# Patient Record
Sex: MALE | HISPANIC OR LATINO | Employment: FULL TIME | URBAN - METROPOLITAN AREA
[De-identification: names, ages, dates, MRNs, and addresses within clinical notes are randomized per-mention and may not be internally consistent; named-entity substitution may affect disease eponyms.]

---

## 2019-12-23 ENCOUNTER — HOSPITAL ENCOUNTER (EMERGENCY)
Facility: MEDICAL CENTER | Age: 29
End: 2019-12-23
Attending: EMERGENCY MEDICINE
Payer: COMMERCIAL

## 2019-12-23 VITALS
WEIGHT: 174.82 LBS | SYSTOLIC BLOOD PRESSURE: 117 MMHG | RESPIRATION RATE: 19 BRPM | BODY MASS INDEX: 28.1 KG/M2 | HEIGHT: 66 IN | DIASTOLIC BLOOD PRESSURE: 75 MMHG | HEART RATE: 71 BPM | OXYGEN SATURATION: 98 % | TEMPERATURE: 97.9 F

## 2019-12-23 DIAGNOSIS — R10.9 FLANK PAIN: ICD-10-CM

## 2019-12-23 LAB
ANION GAP SERPL CALC-SCNC: 9 MMOL/L (ref 0–11.9)
APPEARANCE UR: CLEAR
BACTERIA #/AREA URNS HPF: NEGATIVE /HPF
BILIRUB UR QL STRIP.AUTO: NEGATIVE
BUN SERPL-MCNC: 15 MG/DL (ref 8–22)
CALCIUM SERPL-MCNC: 9.1 MG/DL (ref 8.5–10.5)
CHLORIDE SERPL-SCNC: 105 MMOL/L (ref 96–112)
CO2 SERPL-SCNC: 25 MMOL/L (ref 20–33)
COLOR UR: YELLOW
CREAT SERPL-MCNC: 0.87 MG/DL (ref 0.5–1.4)
EPI CELLS #/AREA URNS HPF: NEGATIVE /HPF
GLUCOSE SERPL-MCNC: 102 MG/DL (ref 65–99)
GLUCOSE UR STRIP.AUTO-MCNC: NEGATIVE MG/DL
HYALINE CASTS #/AREA URNS LPF: ABNORMAL /LPF
KETONES UR STRIP.AUTO-MCNC: NEGATIVE MG/DL
LEUKOCYTE ESTERASE UR QL STRIP.AUTO: NEGATIVE
MICRO URNS: ABNORMAL
NITRITE UR QL STRIP.AUTO: NEGATIVE
PH UR STRIP.AUTO: 7 [PH] (ref 5–8)
POTASSIUM SERPL-SCNC: 4 MMOL/L (ref 3.6–5.5)
PROT UR QL STRIP: NEGATIVE MG/DL
RBC # URNS HPF: ABNORMAL /HPF
RBC UR QL AUTO: ABNORMAL
SODIUM SERPL-SCNC: 139 MMOL/L (ref 135–145)
SP GR UR STRIP.AUTO: 1.01
UROBILINOGEN UR STRIP.AUTO-MCNC: 0.2 MG/DL
WBC #/AREA URNS HPF: ABNORMAL /HPF

## 2019-12-23 PROCEDURE — 36415 COLL VENOUS BLD VENIPUNCTURE: CPT

## 2019-12-23 PROCEDURE — 700111 HCHG RX REV CODE 636 W/ 250 OVERRIDE (IP): Performed by: EMERGENCY MEDICINE

## 2019-12-23 PROCEDURE — 96372 THER/PROPH/DIAG INJ SC/IM: CPT

## 2019-12-23 PROCEDURE — 80048 BASIC METABOLIC PNL TOTAL CA: CPT

## 2019-12-23 PROCEDURE — 99284 EMERGENCY DEPT VISIT MOD MDM: CPT

## 2019-12-23 PROCEDURE — 81001 URINALYSIS AUTO W/SCOPE: CPT

## 2019-12-23 RX ORDER — KETOROLAC TROMETHAMINE 10 MG/1
10 TABLET, FILM COATED ORAL EVERY 6 HOURS PRN
Qty: 20 TAB | Refills: 0 | Status: SHIPPED | OUTPATIENT
Start: 2019-12-23 | End: 2019-12-28

## 2019-12-23 RX ORDER — ONDANSETRON 4 MG/1
4 TABLET, ORALLY DISINTEGRATING ORAL ONCE
Status: COMPLETED | OUTPATIENT
Start: 2019-12-23 | End: 2019-12-23

## 2019-12-23 RX ORDER — TAMSULOSIN HYDROCHLORIDE 0.4 MG/1
0.4 CAPSULE ORAL DAILY
Qty: 30 CAP | Refills: 0 | Status: SHIPPED | OUTPATIENT
Start: 2019-12-23

## 2019-12-23 RX ORDER — KETOROLAC TROMETHAMINE 30 MG/ML
15 INJECTION, SOLUTION INTRAMUSCULAR; INTRAVENOUS ONCE
Status: COMPLETED | OUTPATIENT
Start: 2019-12-23 | End: 2019-12-23

## 2019-12-23 RX ADMIN — KETOROLAC TROMETHAMINE 15 MG: 30 INJECTION, SOLUTION INTRAMUSCULAR at 22:32

## 2019-12-23 RX ADMIN — ONDANSETRON 4 MG: 4 TABLET, ORALLY DISINTEGRATING ORAL at 22:32

## 2019-12-23 SDOH — HEALTH STABILITY: MENTAL HEALTH: HOW OFTEN DO YOU HAVE 6 OR MORE DRINKS ON ONE OCCASION?: NEVER

## 2019-12-23 SDOH — HEALTH STABILITY: MENTAL HEALTH: HOW MANY STANDARD DRINKS CONTAINING ALCOHOL DO YOU HAVE ON A TYPICAL DAY?: 1 OR 2

## 2019-12-23 SDOH — HEALTH STABILITY: MENTAL HEALTH: HOW OFTEN DO YOU HAVE A DRINK CONTAINING ALCOHOL?: 2-3 TIMES A WEEK

## 2019-12-23 ASSESSMENT — PAIN DESCRIPTION - DESCRIPTORS: DESCRIPTORS: PRESSURE;CRAMPING

## 2019-12-23 ASSESSMENT — ENCOUNTER SYMPTOMS
BLOOD IN STOOL: 0
BACK PAIN: 1
FEVER: 0

## 2019-12-23 ASSESSMENT — LIFESTYLE VARIABLES: DO YOU DRINK ALCOHOL: NO

## 2019-12-24 NOTE — ED NOTES
Education provided to on discharge instructions, follow up instructions, and medication instructions. Pt verbalized understanding. Pt dressed and walked out in stable condition.     Pt sent with two scripts.

## 2019-12-24 NOTE — DISCHARGE INSTRUCTIONS
You likely have a kidney stone that caused your symptoms, since we deferred CT we cannot say for sure but given your history it is the most likely diagnosis.

## 2019-12-24 NOTE — ED PROVIDER NOTES
ED Provider Note    Scribed for Tristen Fallon M.D. by Catherine Barnhart. 12/23/2019  10:16 PM    Means of arrival: Walk in  History obtained by: patient  Limitations: none      CHIEF COMPLAINT  Chief Complaint   Patient presents with   • Flank Pain     c/o right flank pain x 30 mins. + N/V. Hx of kidney stone. described pain as pressure / cramping.       HPI  Octavio Ambrocio is a 29 y.o. male who presents to the emergency department for possible kidney stones. He has associated right sided back pain and dysuria.  Patient reports pain is colicky, it comes in waves that are periodic, sporadic and resolves without any intervention.  He reports the pain feels identical to prior kidney stones.  At this time his pain is improved and he continues to have a burning sensation. Endorses sweating and emesis x2 secondary to pain.  He denies any associated chest pain or shortness of breath.  Patient denies any right upper quadrant pain or abdominal pain otherwise.  Denies past surgical history for removal of kidney stones. Denies fevers, hematuria, hematochezia or melena.  Emesis was nonbloody nonbilious.    REVIEW OF SYSTEMS  Review of Systems   Constitutional: Negative for fever.   Gastrointestinal: Negative for blood in stool and melena.   Genitourinary: Positive for dysuria. Negative for hematuria.   Musculoskeletal: Positive for back pain.       See HPI for further details.     PAST MEDICAL HISTORY   has a past medical history of Kidney stone.    SOCIAL HISTORY  Social History     Tobacco Use   • Smoking status: Never Smoker   • Smokeless tobacco: Never Used   Substance and Sexual Activity   • Alcohol use: Yes     Frequency: 2-3 times a week     Drinks per session: 1 or 2     Binge frequency: Never   • Drug use: No   • Sexual activity: Not on file       SURGICAL HISTORY  patient denies any surgical history    CURRENT MEDICATIONS  Home Medications     Reviewed by Alexandr Chahal R.N. (Registered Nurse) on 12/23/19 at 1944  Med  "List Status: Complete   Medication Last Dose Status        Patient Scott Taking any Medications                       ALLERGIES  No Known Allergies    PHYSICAL EXAM  /102   Pulse 72   Temp 36.6 °C (97.9 °F) (Temporal)   Resp 19   Ht 1.676 m (5' 6\")   Wt 79.3 kg (174 lb 13.2 oz)   SpO2 94%   BMI 28.22 kg/m²     Constitutional: Well developed, Well nourished, No acute distress, Non-toxic appearance.   HENT: Normocephalic, Atraumatic,  Eyes: PERRL, EOM intact  Neck: Supple, no meningismus  Lymphatic: No lymphadenopathy noted.   Cardiovascular: Regular rate and rhythm  Lungs: Clear to auscultation bilaterally, easy unlabored respirations   Abdomen: Bowel sounds normal, Soft, No tenderness  Skin: Warm, Dry, no rash  Back: Mild right sided CVA tenderness.  Extremities: No edema to lower extremities  Neurologic: Alert and oriented, appropriate, follows commands, moving all extremities, normal speech   Psychiatric: Affect normal      DIAGNOSTIC STUDIES / PROCEDURES    LABS  Results for orders placed or performed during the hospital encounter of 12/23/19   URINALYSIS,CULTURE IF INDICATED   Result Value Ref Range    Color Yellow     Character Clear     Specific Gravity 1.012 <1.035    Ph 7.0 5.0 - 8.0    Glucose Negative Negative mg/dL    Ketones Negative Negative mg/dL    Protein Negative Negative mg/dL    Bilirubin Negative Negative    Urobilinogen, Urine 0.2 Negative    Nitrite Negative Negative    Leukocyte Esterase Negative Negative    Occult Blood Trace (A) Negative    Micro Urine Req Microscopic    Basic Metabolic Panel   Result Value Ref Range    Sodium 139 135 - 145 mmol/L    Potassium 4.0 3.6 - 5.5 mmol/L    Chloride 105 96 - 112 mmol/L    Co2 25 20 - 33 mmol/L    Glucose 102 (H) 65 - 99 mg/dL    Bun 15 8 - 22 mg/dL    Creatinine 0.87 0.50 - 1.40 mg/dL    Calcium 9.1 8.5 - 10.5 mg/dL    Anion Gap 9.0 0.0 - 11.9   URINE MICROSCOPIC (W/UA)   Result Value Ref Range    WBC 0-2 (A) /hpf    RBC 2-5 (A) /hpf "    Bacteria Negative None /hpf    Epithelial Cells Negative /hpf    Hyaline Cast 0-2 /lpf   ESTIMATED GFR   Result Value Ref Range    GFR If African American >60 >60 mL/min/1.73 m 2    GFR If Non African American >60 >60 mL/min/1.73 m 2     All labs reviewed by me.    COURSE & MEDICAL DECISION MAKING  Pertinent Labs & Imaging studies reviewed. (See chart for details)    Patient here symptoms consistent with kidney stone clinically.  Abdominal pathology otherwise unlikely given patient's benign abdominal exam though possible.  Patient would like to defer CT, he is worried with the cost and radiation.  I discussed with him that without CT we cannot fully rule out alternative pathology or identify the size of the stone but given patient's very reassuring abdominal exam and his bedside ultrasound which fails to reveal any severe hydronephrosis I am comfortable deferring at this point in lieu of strict return precautions.  Patient's urinalysis without any evidence of infection.  Patient's kidney function is normal.  Home with follow-up with urology, return precautions and Toradol and Flomax.      10:16 PM Patient seen and examined at bedside. The patient presents with right sided back pain and dysuria.Discussed CT to confirm stone and evaluate side but patient deferred due to radiation exposure. I discussed that we will check labs to evaluate his kidney function. Ordered for urine microscopic, BMP, and UA to evaluate. Patient will be treated with Toradol 15 mg and Zofran 4 mg for his symptoms.     11:34 PM Patient was reevaluated at bedside. Discussed lab results with the patient and informed them that everything looked normal. Prescribed Toradol and Flomax for treatment of the patients symptoms. Advised patient to return if his symptoms do not improve in the next 5 days. Patient will be discharged at this time. Patient verbalizes agreement with discharge and plan of care.        The patient will return for new or  worsening symptoms and is stable at the time of discharge.    The patient is referred to a primary physician for blood pressure management, diabetic screening, and for all other preventative health concerns.    DISPOSITION:  Patient will be discharged home in stable condition.    FOLLOW UP:  [facility], Emergency Dept  [address]  [place] [id number] [phone]  In 5 days  if symptoms haven't resolved    Gino Upton M.D.  [address]  [place] [address]  [phone]    Schedule an appointment as soon as possible for a visit         OUTPATIENT MEDICATIONS:  New Prescriptions    KETOROLAC (TORADOL) 10 MG TAB    Take 1 Tab by mouth every 6 hours as needed for Moderate Pain or Severe Pain for up to 5 days.    TAMSULOSIN (FLOMAX) 0.4 MG CAPSULE    Take 1 Cap by mouth every day.       FINAL IMPRESSION  1. Flank pain          Catherine ALICEA (Scribe), am scribing for, and in the presence of, Tristen Fallon M.D..    Electronically signed by: Catherine Barnhart (Scribe), 12/23/2019    ITristen M.D. personally performed the services described in this documentation, as scribed by Catherine Barnhart in my presence, and it is both accurate and complete. E    The note accurately reflects work and decisions made by me.  Tristen Fallon  12/24/2019  2:27 AM

## 2019-12-24 NOTE — ED NOTES
"Agree with triage assessment, no changes noted. Pt reports \"This feels like my old kidney stone.\" Pt reports pain of 8 out of 10. Pt hooked to monitor. Pt denies any further needs at this time. Call light within reach. Bed in low locked position. Comfort measures provided.     Labs sent. Medicated per MAR.   "

## 2019-12-24 NOTE — ED TRIAGE NOTES
Octavio Ambrocio  29 y.o.  male  Chief Complaint   Patient presents with   • Flank Pain     c/o right flank pain x 30 mins. + N/V. Hx of kidney stone. described pain as pressure / cramping.

## 2022-12-11 ENCOUNTER — APPOINTMENT (OUTPATIENT)
Dept: RADIOLOGY | Facility: MEDICAL CENTER | Age: 32
End: 2022-12-11
Attending: EMERGENCY MEDICINE

## 2022-12-11 ENCOUNTER — HOSPITAL ENCOUNTER (EMERGENCY)
Facility: MEDICAL CENTER | Age: 32
End: 2022-12-11
Attending: EMERGENCY MEDICINE

## 2022-12-11 VITALS
HEART RATE: 74 BPM | SYSTOLIC BLOOD PRESSURE: 121 MMHG | TEMPERATURE: 97.5 F | OXYGEN SATURATION: 95 % | BODY MASS INDEX: 28.24 KG/M2 | DIASTOLIC BLOOD PRESSURE: 77 MMHG | HEIGHT: 66 IN | RESPIRATION RATE: 16 BRPM | WEIGHT: 175.71 LBS

## 2022-12-11 DIAGNOSIS — N20.1 URETEROLITHIASIS: ICD-10-CM

## 2022-12-11 LAB
ALBUMIN SERPL BCP-MCNC: 4.5 G/DL (ref 3.2–4.9)
ALBUMIN/GLOB SERPL: 1.5 G/DL
ALP SERPL-CCNC: 92 U/L (ref 30–99)
ALT SERPL-CCNC: 68 U/L (ref 2–50)
ANION GAP SERPL CALC-SCNC: 13 MMOL/L (ref 7–16)
APPEARANCE UR: CLEAR
AST SERPL-CCNC: 36 U/L (ref 12–45)
BACTERIA #/AREA URNS HPF: NEGATIVE /HPF
BASOPHILS # BLD AUTO: 0.6 % (ref 0–1.8)
BASOPHILS # BLD: 0.07 K/UL (ref 0–0.12)
BILIRUB SERPL-MCNC: 0.3 MG/DL (ref 0.1–1.5)
BILIRUB UR QL STRIP.AUTO: NEGATIVE
BUN SERPL-MCNC: 15 MG/DL (ref 8–22)
CALCIUM SERPL-MCNC: 8.9 MG/DL (ref 8.5–10.5)
CHLORIDE SERPL-SCNC: 103 MMOL/L (ref 96–112)
CO2 SERPL-SCNC: 22 MMOL/L (ref 20–33)
COLOR UR: YELLOW
CREAT SERPL-MCNC: 1.02 MG/DL (ref 0.5–1.4)
EOSINOPHIL # BLD AUTO: 0.39 K/UL (ref 0–0.51)
EOSINOPHIL NFR BLD: 3.5 % (ref 0–6.9)
EPI CELLS #/AREA URNS HPF: NEGATIVE /HPF
ERYTHROCYTE [DISTWIDTH] IN BLOOD BY AUTOMATED COUNT: 42.6 FL (ref 35.9–50)
GFR SERPLBLD CREATININE-BSD FMLA CKD-EPI: 99 ML/MIN/1.73 M 2
GLOBULIN SER CALC-MCNC: 3 G/DL (ref 1.9–3.5)
GLUCOSE SERPL-MCNC: 101 MG/DL (ref 65–99)
GLUCOSE UR STRIP.AUTO-MCNC: NEGATIVE MG/DL
HCT VFR BLD AUTO: 44.1 % (ref 42–52)
HGB BLD-MCNC: 15.5 G/DL (ref 14–18)
IMM GRANULOCYTES # BLD AUTO: 0.04 K/UL (ref 0–0.11)
IMM GRANULOCYTES NFR BLD AUTO: 0.4 % (ref 0–0.9)
KETONES UR STRIP.AUTO-MCNC: NEGATIVE MG/DL
LEUKOCYTE ESTERASE UR QL STRIP.AUTO: NEGATIVE
LIPASE SERPL-CCNC: 21 U/L (ref 11–82)
LYMPHOCYTES # BLD AUTO: 2.01 K/UL (ref 1–4.8)
LYMPHOCYTES NFR BLD: 18.1 % (ref 22–41)
MCH RBC QN AUTO: 30.8 PG (ref 27–33)
MCHC RBC AUTO-ENTMCNC: 35.1 G/DL (ref 33.7–35.3)
MCV RBC AUTO: 87.7 FL (ref 81.4–97.8)
MICRO URNS: ABNORMAL
MONOCYTES # BLD AUTO: 0.5 K/UL (ref 0–0.85)
MONOCYTES NFR BLD AUTO: 4.5 % (ref 0–13.4)
MUCOUS THREADS #/AREA URNS HPF: ABNORMAL /HPF
NEUTROPHILS # BLD AUTO: 8.1 K/UL (ref 1.82–7.42)
NEUTROPHILS NFR BLD: 72.9 % (ref 44–72)
NITRITE UR QL STRIP.AUTO: NEGATIVE
NRBC # BLD AUTO: 0 K/UL
NRBC BLD-RTO: 0 /100 WBC
PH UR STRIP.AUTO: 7.5 [PH] (ref 5–8)
PLATELET # BLD AUTO: 279 K/UL (ref 164–446)
PMV BLD AUTO: 10.1 FL (ref 9–12.9)
POTASSIUM SERPL-SCNC: 3.6 MMOL/L (ref 3.6–5.5)
PROT SERPL-MCNC: 7.5 G/DL (ref 6–8.2)
PROT UR QL STRIP: NEGATIVE MG/DL
RBC # BLD AUTO: 5.03 M/UL (ref 4.7–6.1)
RBC # URNS HPF: >150 /HPF
RBC UR QL AUTO: ABNORMAL
SODIUM SERPL-SCNC: 138 MMOL/L (ref 135–145)
SP GR UR STRIP.AUTO: 1.01
UROBILINOGEN UR STRIP.AUTO-MCNC: 0.2 MG/DL
WBC # BLD AUTO: 11.1 K/UL (ref 4.8–10.8)
WBC #/AREA URNS HPF: ABNORMAL /HPF

## 2022-12-11 PROCEDURE — 80053 COMPREHEN METABOLIC PANEL: CPT

## 2022-12-11 PROCEDURE — 700111 HCHG RX REV CODE 636 W/ 250 OVERRIDE (IP): Performed by: EMERGENCY MEDICINE

## 2022-12-11 PROCEDURE — 74176 CT ABD & PELVIS W/O CONTRAST: CPT

## 2022-12-11 PROCEDURE — 81001 URINALYSIS AUTO W/SCOPE: CPT

## 2022-12-11 PROCEDURE — 96374 THER/PROPH/DIAG INJ IV PUSH: CPT

## 2022-12-11 PROCEDURE — 99284 EMERGENCY DEPT VISIT MOD MDM: CPT

## 2022-12-11 PROCEDURE — 96375 TX/PRO/DX INJ NEW DRUG ADDON: CPT

## 2022-12-11 PROCEDURE — 83690 ASSAY OF LIPASE: CPT

## 2022-12-11 PROCEDURE — 36415 COLL VENOUS BLD VENIPUNCTURE: CPT

## 2022-12-11 PROCEDURE — 700102 HCHG RX REV CODE 250 W/ 637 OVERRIDE(OP): Performed by: EMERGENCY MEDICINE

## 2022-12-11 PROCEDURE — 85025 COMPLETE CBC W/AUTO DIFF WBC: CPT

## 2022-12-11 PROCEDURE — A9270 NON-COVERED ITEM OR SERVICE: HCPCS | Performed by: EMERGENCY MEDICINE

## 2022-12-11 RX ORDER — OXYCODONE HYDROCHLORIDE AND ACETAMINOPHEN 5; 325 MG/1; MG/1
1 TABLET ORAL EVERY 4 HOURS PRN
Qty: 20 TABLET | Refills: 0 | Status: SHIPPED | OUTPATIENT
Start: 2022-12-11 | End: 2022-12-18

## 2022-12-11 RX ORDER — ONDANSETRON 4 MG/1
4 TABLET, ORALLY DISINTEGRATING ORAL EVERY 6 HOURS PRN
Qty: 15 TABLET | Refills: 0 | Status: SHIPPED | OUTPATIENT
Start: 2022-12-11

## 2022-12-11 RX ORDER — HYDROMORPHONE HYDROCHLORIDE 1 MG/ML
0.5 INJECTION, SOLUTION INTRAMUSCULAR; INTRAVENOUS; SUBCUTANEOUS ONCE
Status: COMPLETED | OUTPATIENT
Start: 2022-12-11 | End: 2022-12-11

## 2022-12-11 RX ORDER — TAMSULOSIN HYDROCHLORIDE 0.4 MG/1
0.4 CAPSULE ORAL
Qty: 15 CAPSULE | Refills: 0 | Status: SHIPPED | OUTPATIENT
Start: 2022-12-11

## 2022-12-11 RX ORDER — KETOROLAC TROMETHAMINE 30 MG/ML
30 INJECTION, SOLUTION INTRAMUSCULAR; INTRAVENOUS ONCE
Status: COMPLETED | OUTPATIENT
Start: 2022-12-11 | End: 2022-12-11

## 2022-12-11 RX ORDER — TAMSULOSIN HYDROCHLORIDE 0.4 MG/1
0.4 CAPSULE ORAL ONCE
Status: COMPLETED | OUTPATIENT
Start: 2022-12-11 | End: 2022-12-11

## 2022-12-11 RX ORDER — ONDANSETRON 2 MG/ML
4 INJECTION INTRAMUSCULAR; INTRAVENOUS ONCE
Status: COMPLETED | OUTPATIENT
Start: 2022-12-11 | End: 2022-12-11

## 2022-12-11 RX ADMIN — KETOROLAC TROMETHAMINE 30 MG: 30 INJECTION, SOLUTION INTRAMUSCULAR; INTRAVENOUS at 20:02

## 2022-12-11 RX ADMIN — HYDROMORPHONE HYDROCHLORIDE 0.5 MG: 1 INJECTION, SOLUTION INTRAMUSCULAR; INTRAVENOUS; SUBCUTANEOUS at 20:02

## 2022-12-11 RX ADMIN — TAMSULOSIN HYDROCHLORIDE 0.4 MG: 0.4 CAPSULE ORAL at 20:19

## 2022-12-11 RX ADMIN — ONDANSETRON 4 MG: 2 INJECTION INTRAMUSCULAR; INTRAVENOUS at 20:01

## 2022-12-12 NOTE — ED NOTES
Patient educated on discharge instructions, follow up appointments, prescriptions, and home care. Patient verbalized understanding. Patient ambulated well to [facility].

## 2022-12-12 NOTE — DISCHARGE INSTRUCTIONS
Please follow-up with urology to discuss future stone prevention  Take Percocet as needed for pain control with ibuprofen and Zofran for nausea  Take Flomax until this kidney stone passes.

## 2022-12-12 NOTE — ED TRIAGE NOTES
Pt ambulated to triage with   Chief Complaint   Patient presents with    Flank Pain     H/o kidney stone, 2 months ago had right flank pain and today got worse.  Pt reports vomiting.  Reports blood in urine today.         Protocol ordered.  Pt Informed regarding triage process and verbalized understanding to inform triage tech or RN for any changes in condition. Placed in lobby.

## 2022-12-12 NOTE — ED PROVIDER NOTES
"ED Provider Note    CHIEF COMPLAINT  Chief Complaint   Patient presents with    Flank Pain     H/o kidney stone, 2 months ago had right flank pain and today got worse.  Pt reports vomiting.  Reports blood in urine today.         HPI  Octavio Ambrocio is a 32 y.o. male with a kidney stone history in the past x3 other episodes states that he began having pain at 5 PM acutely on the right flank area that is not extending into his right groin or testicle.  He vomited once and is in considerable pain at this time.  Did report blood in his urine today.  Denies any left-sided pain at all.  Did not take anything prior to arrival and reports his pain is severe and constant sharp in character and well localized to the right flank    REVIEW OF SYSTEMS  See HPI for further details. All other systems are negative.     PAST MEDICAL HISTORY  Past Medical History:   Diagnosis Date    Kidney stone        FAMILY HISTORY  History reviewed. No pertinent family history.    SOCIAL HISTORY   reports that he has never smoked. He has never used smokeless tobacco. He reports current alcohol use. He reports that he does not use drugs.    SURGICAL HISTORY  History reviewed. No pertinent surgical history.    CURRENT MEDICATIONS  Home Medications       Reviewed by Ana Tao R.N. (Registered Nurse) on 12/11/22 at [facility]  Med List Status: Partial     Medication Last Dose Status   tamsulosin (FLOMAX) 0.4 MG capsule  Active                    ALLERGIES  No Known Allergies    PHYSICAL EXAM  VITAL SIGNS: /79   Pulse 78   Temp 36.1 °C (96.9 °F) (Temporal)   Resp 16   Ht 1.676 m (5' 6\")   Wt 79.7 kg (175 lb 11.3 oz)   SpO2 97%   BMI 28.36 kg/m²    Constitutional: Well developed, Well nourished, No acute distress, uncomfortable appearance.   HENT: Normocephalic, Atraumatic, Bilateral external ears normal, Oropharynx is clear mucous membranes are moist. No oral exudates or nasal discharge.   Eyes: Pupils are equal round and reactive, " EOMI, Conjunctiva normal, No discharge.   Neck: Normal range of motion, No tenderness, Supple, No stridor. No meningismus.  Lymphatic: No lymphadenopathy noted.   Cardiovascular: Regular rate and rhythm without murmur rub or gallop.  Thorax & Lungs: Clear breath sounds bilaterally without wheezes, rhonchi or rales. There is no chest wall tenderness.   Abdomen: Soft non-tender non-distended. There is no rebound or guarding. No organomegaly is appreciated. Bowel sounds are normal.  Skin: Normal without rash.   Back: Right-sided CVA and right flank tenderness.   Extremities: Intact distal pulses, No edema, No tenderness, No cyanosis, No clubbing. Capillary refill is less than 2 seconds.  Musculoskeletal: Good range of motion in all major joints. No tenderness to palpation or major deformities noted.   Neurologic: Alert & oriented x 3, Normal motor function, Normal sensory function, No focal deficits noted. Reflexes are normal.  Psychiatric: Affect normal, Judgment normal, Mood normal. There is no suicidal ideation or patient reported hallucinations.       RADIOLOGY/PROCEDURES  CT-RENAL COLIC EVALUATION(A/P W/O)   Final Result      1.  Calcified old granulomatous disease with a tiny 4 mm calcified granuloma in the left lung lower lobe.   2.  3 mm right UVJ stone with surrounding inflammatory stranding. Mild calyceal dilatation on the right.   3.  2 mm nonobstructive calculus in the left kidney at the lower-midpole.   4.  Normal appendix            COURSE & MEDICAL DECISION MAKING  Pertinent Labs & Imaging studies reviewed. (See chart for details)  At high suspicion for recurrence of another kidney stone in this gentleman who has significant right flank pain and hematuria earlier grossly.  He was delayed in give us urine sample but did get medicated with Dilaudid and Toradol as well as Zofran and this gave him significant relief.  He did not require IV fluids.    CAT scan noncontrast of the abdomen pelvis/collecting  system shows old granulomatous disease with a 4 mm calcified granuloma of the left lower lobe of the lung.  Does have a 3 mm right UVJ stone with surrounding inflammatory stranding.  Also has a nonobstructive calculus in the left kidney is 2 mm    Laboratory evaluation shows slight leukocytosis 11,000 with 73% PMN shift but no significant electrolyte derangements or acidosis.  Urinalysis shows blood but no evidence of infection.    I reviewed his  and gave him a prescription for Percocet to be used as needed with ibuprofen and Zofran and Flomax until he passes this kidney stone we will strain all urine.  Discharged in stable condition will return if any significant change in symptoms    FINAL IMPRESSION  1. Ureterolithiasis             Electronically signed by: Francisco Rogel M.D., 12/11/2022 8:44 PM

## 2022-12-12 NOTE — ED NOTES
Pt ambulatory to rm w/o assistance. Changed, connected to VS, resting comfortably NAD w/ equal chest rise/fall. Chart up for ERP.

## 2022-12-12 NOTE — ED NOTES
"Pt discharged to [facility], ambulated with steady gait, AOx4. IV discontinued and gauze placed, pt in possession of belongings. Pt provided discharge education and information pertaining to medications and follow up appointments. Pt received copy of discharge instructions and verbalized understanding. /77   Pulse 74   Temp 36.4 °C (97.5 °F) (Temporal)   Resp 16   Ht 1.676 m (5' 6\")   Wt 79.7 kg (175 lb 11.3 oz)   SpO2 95%   BMI 28.36 kg/m²   "

## 2024-03-02 ENCOUNTER — APPOINTMENT (OUTPATIENT)
Dept: RADIOLOGY | Facility: MEDICAL CENTER | Age: 34
End: 2024-03-02
Attending: EMERGENCY MEDICINE
Payer: COMMERCIAL

## 2024-03-02 ENCOUNTER — HOSPITAL ENCOUNTER (EMERGENCY)
Facility: MEDICAL CENTER | Age: 34
End: 2024-03-02
Attending: EMERGENCY MEDICINE
Payer: COMMERCIAL

## 2024-03-02 VITALS
HEART RATE: 69 BPM | DIASTOLIC BLOOD PRESSURE: 83 MMHG | WEIGHT: 187.83 LBS | SYSTOLIC BLOOD PRESSURE: 132 MMHG | RESPIRATION RATE: 16 BRPM | HEIGHT: 66 IN | TEMPERATURE: 97.8 F | BODY MASS INDEX: 30.19 KG/M2 | OXYGEN SATURATION: 97 %

## 2024-03-02 DIAGNOSIS — S20.212A CONTUSION OF LEFT CHEST WALL, INITIAL ENCOUNTER: ICD-10-CM

## 2024-03-02 DIAGNOSIS — M54.6 ACUTE LEFT-SIDED THORACIC BACK PAIN: ICD-10-CM

## 2024-03-02 PROCEDURE — 72070 X-RAY EXAM THORAC SPINE 2VWS: CPT

## 2024-03-02 PROCEDURE — 71111 X-RAY EXAM RIBS/CHEST4/> VWS: CPT

## 2024-03-02 PROCEDURE — A9270 NON-COVERED ITEM OR SERVICE: HCPCS | Performed by: EMERGENCY MEDICINE

## 2024-03-02 PROCEDURE — 700102 HCHG RX REV CODE 250 W/ 637 OVERRIDE(OP): Performed by: EMERGENCY MEDICINE

## 2024-03-02 PROCEDURE — 99283 EMERGENCY DEPT VISIT LOW MDM: CPT

## 2024-03-02 RX ORDER — OXYCODONE HYDROCHLORIDE AND ACETAMINOPHEN 5; 325 MG/1; MG/1
1 TABLET ORAL ONCE
Status: COMPLETED | OUTPATIENT
Start: 2024-03-02 | End: 2024-03-02

## 2024-03-02 RX ORDER — NAPROXEN 500 MG/1
500 TABLET ORAL 2 TIMES DAILY WITH MEALS
Qty: 60 TABLET | Refills: 0 | Status: SHIPPED | OUTPATIENT
Start: 2024-03-02

## 2024-03-02 RX ORDER — OXYCODONE HYDROCHLORIDE AND ACETAMINOPHEN 5; 325 MG/1; MG/1
1 TABLET ORAL EVERY 4 HOURS PRN
Qty: 8 TABLET | Refills: 0 | Status: SHIPPED | OUTPATIENT
Start: 2024-03-02 | End: 2024-03-05

## 2024-03-02 RX ORDER — IBUPROFEN 600 MG/1
600 TABLET ORAL ONCE
Status: COMPLETED | OUTPATIENT
Start: 2024-03-02 | End: 2024-03-02

## 2024-03-02 RX ADMIN — IBUPROFEN 600 MG: 600 TABLET, FILM COATED ORAL at 15:36

## 2024-03-02 RX ADMIN — OXYCODONE AND ACETAMINOPHEN 1 TABLET: 5; 325 TABLET ORAL at 15:36

## 2024-03-02 ASSESSMENT — FIBROSIS 4 INDEX: FIB4 SCORE: 0.55

## 2024-03-02 NOTE — ED TRIAGE NOTES
Octaviopatrick Ambrocio  33 y.o. male  Chief Complaint   Patient presents with    Back Pain     Pt sneezed this morning and then felt tear/tweak in back.   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 Fell and hit back onto top of fire hydrant (4 days ago) Pain was ok at this time until today.   L Mid back        Pt amb to triage with steady gait for above complaint.   Pt is alert and oriented, speaking in full sentences, follows commands and responds appropriately to questions. Not in any apparent distress. Respirations are even and unlabored.  Pt placed in lobby. Pt educated on triage process. Pt encouraged to alert staff for any changes.

## 2024-03-02 NOTE — ED PROVIDER NOTES
ED Provider Note    CHIEF COMPLAINT  Chief Complaint   Patient presents with    Back Pain     Pt sneezed this morning and then felt tear/tweak in back.   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 Fell and hit back onto top of fire hydrant (4 days ago) Pain was ok at this time until today.   L Mid back      EXTERNAL RECORDS REVIEWED  Patient was last seen in the emergency department December 2022 for ureterolithiasis    HPI/ROS  LIMITATION TO HISTORY   Select: : None  OUTSIDE HISTORIAN(S):  None    Octavio Ambrocio is a 33 y.o. male who presents to the Emergency Department with thoracic back pain.  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  He had some bruising to the area and it has been sore but he has been able to work and drive.  Today the pain worsened after he sneezed.  Pain does not radiate down his legs or arms.  No numbness or weakness in the extremities.  He is urinating normally without blood in the urine or bowel or bladder incontinence.  He has not taken any medications prior to coming in for evaluation.    PAST MEDICAL HISTORY  Past Medical History:   Diagnosis Date    Kidney stone         SURGICAL HISTORY  No past surgical history on file.     FAMILY HISTORY  No family history on file.    SOCIAL HISTORY   reports that he has never smoked. He has never used smokeless tobacco. He reports current alcohol use. He reports that he does not use drugs.    CURRENT MEDICATIONS  Discharge Medication List as of 3/2/2024  4:15 PM        CONTINUE these medications which have NOT CHANGED    Details   !! tamsulosin (FLOMAX) 0.4 MG capsule Take 1 Capsule by mouth 1/2 hour after breakfast., Disp-15 Capsule, R-0, Normal      ondansetron (ZOFRAN ODT) 4 MG TABLET DISPERSIBLE Take 1 Tablet by mouth every 6 hours as needed for Nausea/Vomiting., Disp-15 Tablet, R-0, Normal      !! tamsulosin (FLOMAX) 0.4  "MG capsule Take 1 Cap by mouth every day., Disp-30 Cap, R-0, Print Rx Paper       !! - Potential duplicate medications found. Please discuss with provider.          ALLERGIES  Patient has no known allergies.    PHYSICAL EXAM  /83   Pulse 69   Temp 36.6 °C (97.8 °F) (Temporal)   Resp 16   Ht 1.676 m (5' 6\")   Wt 85.2 kg (187 lb 13.3 oz)   SpO2 97%      Constitutional: Nontoxic appearing. Alert in no apparent distress.  HENT: Normocephalic, Atraumatic.  Moist mucous membranes.    Neck: Supple, full range of motion  Musculoskeletal: Atraumatic. No obvious deformities noted.   Back: Small bruise noted to the left thoracic area with surrounding tenderness in addition to midline thoracic tenderness.  Skin: Warm, Dry.  No erythema, No rash.   Neurologic: Alert and oriented x3. Moving all extremities spontaneously without focal deficits.  And sensation intact in all 4 extremities.  Psychiatric: Affect normal, Mood normal, Appears appropriate and not intoxicated.      DIAGNOSTIC STUDIES / PROCEDURES        RADIOLOGY  I have independently interpreted the diagnostic imaging associated with this visit and am waiting the final reading from the radiologist.   My preliminary interpretation is a follows: no displaced rib fracture  Radiologist interpretation:   [id number] (WITH 1-VIEW CXR)   Final Result      1.  No acute displaced rib fracture.   2.  Ill-defined left mid hemithorax opacity overlying the left 3rd rib. It is uncertain if this is due to costochondral calcification or whether there could be an underlying pneumonitis or area of scarring in the left lung.      DX-THORACIC SPINE-2 VIEWS   Final Result      Unremarkable thoracic spine.            COURSE & MEDICAL DECISION MAKING    ED Observation Status? No; Patient does not meet criteria for ED Observation.     INITIAL ASSESSMENT, COURSE AND PLAN  Care Narrative: Relatively healthy patient presents with a left-sided thoracic back pain after a fall a few " days ago.  He has normal vital signs on arrival.  No other injuries are identified.  He has no neurologic symptoms concerning for radiculopathy or myelopathy.  X-rays are negative for thoracic spine fracture or displaced rib fracture.  He has no evidence of pneumothorax.  He may have a costochondral calcification or scarring of the left lung that is likely chronic and unlikely related to infection.    Upon reassessment, patient is resting comfortably with normal vital signs.  No new complaints at this time.  Discussed results with patient and/or family as well as importance of primary care follow up.  Patient understands plan of care and strict return precautions for new or changing symptoms.       ADDITIONAL PROBLEM LIST  Problem #1: Contusion of the posterior left chest wall - discharge with inflammatories and short course of pain medication    Problem #2: Acute thoracic back pain -see above      DISPOSITION AND DISCUSSIONS      Barriers to care at this time, including but not limited to: Patient does not have established PCP.       DISPOSITION:  Patient will be discharged home in stable condition.    FOLLOW UP:  [facility]  [address]  [facility] [id number] [phone]  Call   to establish primary care physician    [facility], Emergency Dept  [address] [id number] [phone]    If symptoms worsen      OUTPATIENT MEDICATIONS:  Discharge Medication List as of 3/2/2024  4:15 PM        START taking these medications    Details   naproxen (NAPROSYN) 500 MG Tab Take 1 Tablet by mouth 2 times a day with meals., Disp-60 Tablet, R-0, Normal      oxyCODONE-acetaminophen (PERCOCET) 5-325 MG Tab Take 1 Tablet by mouth every four hours as needed for Severe Pain for up to 3 days., Disp-8 Tablet, R-0, Normal             FINAL DIAGNOSIS  1. Contusion of left chest wall, initial encounter    2. Acute left-sided thoracic back pain      In prescribing  controlled substances to this patient, I certify that I have obtained and reviewed the medical history of Octavio Ambrocio. I have also made a good ronnie effort to obtain applicable records from other providers who have treated the patient and records did not demonstrate any increased risk of substance abuse that would prevent me from prescribing controlled substances.     I have conducted a physical exam and documented it. I have reviewed Mr. Ambrocio’s prescription history as maintained by the [place] Prescription Monitoring Program.     I have assessed the patient’s risk for abuse, dependency, and addiction using the validated Opioid Risk Tool available at https://www.mdcalc.com/nlewdh-vntu-guyh-ort-narcotic-abuse.     Given the above, I believe the benefits of controlled substance therapy outweigh the risks. The reasons for prescribing controlled substances include non-narcotic, oral analgesic alternatives have been inadequate for pain control. Accordingly, I have discussed the risk and benefits, treatment plan, and alternative therapies with the patient.

## 2024-03-03 NOTE — DISCHARGE INSTRUCTIONS
You were seen in the Emergency Department for back pain.    Xrays were completed without significant acute abnormalities.    Please use naproxen scheduled twice daily.  Use additional pain medication only for severe pain.    Please follow up with your primary care physician.    Return to the Emergency Department with uncontrolled pain, trouble breathing, or other concerns.